# Patient Record
Sex: FEMALE | Race: WHITE | Employment: OTHER | URBAN - METROPOLITAN AREA
[De-identification: names, ages, dates, MRNs, and addresses within clinical notes are randomized per-mention and may not be internally consistent; named-entity substitution may affect disease eponyms.]

---

## 2018-01-25 ENCOUNTER — HOSPITAL ENCOUNTER (OUTPATIENT)
Dept: MRI IMAGING | Age: 83
Discharge: HOME | End: 2018-01-25
Attending: ORTHOPAEDIC SURGERY
Payer: MEDICARE

## 2018-01-25 DIAGNOSIS — M51.36 DDD (DEGENERATIVE DISC DISEASE), LUMBAR: ICD-10-CM

## 2018-01-25 PROCEDURE — 72148 MRI LUMBAR SPINE W/O DYE: CPT

## 2018-02-07 ENCOUNTER — HOSPITAL ENCOUNTER (OUTPATIENT)
Dept: INTERVENTIONAL RADIOLOGY/VASCULAR | Age: 83
Discharge: HOME | End: 2018-02-07
Attending: ORTHOPAEDIC SURGERY | Admitting: ORTHOPAEDIC SURGERY
Payer: MEDICARE

## 2018-02-07 ENCOUNTER — HOSPITAL ENCOUNTER (OUTPATIENT)
Dept: CT IMAGING | Age: 83
Discharge: HOME | End: 2018-02-07
Attending: ORTHOPAEDIC SURGERY
Payer: MEDICARE

## 2018-02-07 ENCOUNTER — HOSPITAL ENCOUNTER (OUTPATIENT)
Dept: INTERVENTIONAL RADIOLOGY/VASCULAR | Age: 83
Discharge: HOME | End: 2018-02-07
Attending: ORTHOPAEDIC SURGERY
Payer: MEDICARE

## 2018-02-07 VITALS
OXYGEN SATURATION: 96 % | SYSTOLIC BLOOD PRESSURE: 152 MMHG | DIASTOLIC BLOOD PRESSURE: 87 MMHG | BODY MASS INDEX: 21.22 KG/M2 | HEIGHT: 68 IN | HEART RATE: 74 BPM | RESPIRATION RATE: 20 BRPM | TEMPERATURE: 97.5 F | WEIGHT: 140 LBS

## 2018-02-07 DIAGNOSIS — M79.18 BILATERAL BUTTOCK PAIN: ICD-10-CM

## 2018-02-07 PROCEDURE — 88311 DECALCIFY TISSUE: CPT | Performed by: RADIOLOGY

## 2018-02-07 PROCEDURE — 77030034842 HC TAMP SPN BN INFL EXP II KYPH -I

## 2018-02-07 PROCEDURE — 77030030399

## 2018-02-07 PROCEDURE — 77030003666 HC NDL SPINAL BD -A

## 2018-02-07 PROCEDURE — 99152 MOD SED SAME PHYS/QHP 5/>YRS: CPT

## 2018-02-07 PROCEDURE — 88307 TISSUE EXAM BY PATHOLOGIST: CPT | Performed by: RADIOLOGY

## 2018-02-07 PROCEDURE — 77030034843 HC TAMP SPN BN INFL EXP II KYPH -H

## 2018-02-07 PROCEDURE — 77030003503 HC NDL BIOP TISS BD -B

## 2018-02-07 PROCEDURE — 77030021783 HC SYS CEM DEL MEDT -D

## 2018-02-07 PROCEDURE — 74011250636 HC RX REV CODE- 250/636: Performed by: RADIOLOGY

## 2018-02-07 PROCEDURE — 0201T PERQ SACRAL AUGMT BILAT INJ: CPT

## 2018-02-07 PROCEDURE — 77030021782 HC SYS CEM CART DEL KYPH -C

## 2018-02-07 PROCEDURE — 74011000250 HC RX REV CODE- 250: Performed by: RADIOLOGY

## 2018-02-07 PROCEDURE — 77030007884 HC KT SPN FX KYPH -I2

## 2018-02-07 PROCEDURE — C1713 ANCHOR/SCREW BN/BN,TIS/BN: HCPCS

## 2018-02-07 RX ORDER — DIPHENHYDRAMINE HYDROCHLORIDE 50 MG/ML
25-50 INJECTION, SOLUTION INTRAMUSCULAR; INTRAVENOUS ONCE
Status: COMPLETED | OUTPATIENT
Start: 2018-02-07 | End: 2018-02-07

## 2018-02-07 RX ORDER — LIDOCAINE HYDROCHLORIDE 20 MG/ML
30 INJECTION, SOLUTION INFILTRATION; PERINEURAL ONCE
Status: COMPLETED | OUTPATIENT
Start: 2018-02-07 | End: 2018-02-07

## 2018-02-07 RX ORDER — LATANOPROST 50 UG/ML
1 SOLUTION/ DROPS OPHTHALMIC DAILY
COMMUNITY

## 2018-02-07 RX ORDER — CHLORTHALIDONE 25 MG/1
12.5 TABLET ORAL DAILY
COMMUNITY

## 2018-02-07 RX ORDER — MIDAZOLAM HYDROCHLORIDE 1 MG/ML
5 INJECTION, SOLUTION INTRAMUSCULAR; INTRAVENOUS
Status: DISCONTINUED | OUTPATIENT
Start: 2018-02-07 | End: 2018-02-07

## 2018-02-07 RX ORDER — CEFAZOLIN SODIUM/WATER 2 G/20 ML
2 SYRINGE (ML) INTRAVENOUS ONCE
Status: COMPLETED | OUTPATIENT
Start: 2018-02-07 | End: 2018-02-07

## 2018-02-07 RX ORDER — ASPIRIN 81 MG/1
81 TABLET ORAL DAILY
COMMUNITY

## 2018-02-07 RX ORDER — KETOROLAC TROMETHAMINE 30 MG/ML
15 INJECTION, SOLUTION INTRAMUSCULAR; INTRAVENOUS ONCE
Status: COMPLETED | OUTPATIENT
Start: 2018-02-07 | End: 2018-02-07

## 2018-02-07 RX ORDER — ROSUVASTATIN CALCIUM 20 MG/1
20 TABLET, COATED ORAL DAILY
COMMUNITY

## 2018-02-07 RX ORDER — VALSARTAN 160 MG/1
160 TABLET ORAL 2 TIMES DAILY
COMMUNITY

## 2018-02-07 RX ORDER — BRIMONIDINE TARTRATE, TIMOLOL MALEATE 2; 5 MG/ML; MG/ML
1 SOLUTION/ DROPS OPHTHALMIC EVERY 12 HOURS
COMMUNITY

## 2018-02-07 RX ORDER — FENTANYL CITRATE 50 UG/ML
100 INJECTION, SOLUTION INTRAMUSCULAR; INTRAVENOUS
Status: DISCONTINUED | OUTPATIENT
Start: 2018-02-07 | End: 2018-02-07

## 2018-02-07 RX ORDER — METOPROLOL TARTRATE 50 MG/1
50 TABLET ORAL 2 TIMES DAILY
COMMUNITY

## 2018-02-07 RX ORDER — LEVOTHYROXINE SODIUM 88 UG/1
88 TABLET ORAL
COMMUNITY

## 2018-02-07 RX ORDER — BUPIVACAINE HYDROCHLORIDE 5 MG/ML
30 INJECTION, SOLUTION EPIDURAL; INTRACAUDAL ONCE
Status: COMPLETED | OUTPATIENT
Start: 2018-02-07 | End: 2018-02-07

## 2018-02-07 RX ORDER — ACETAMINOPHEN 500 MG
2000 TABLET ORAL 2 TIMES DAILY
COMMUNITY

## 2018-02-07 RX ORDER — SODIUM CHLORIDE 9 MG/ML
25 INJECTION, SOLUTION INTRAVENOUS CONTINUOUS
Status: DISCONTINUED | OUTPATIENT
Start: 2018-02-07 | End: 2018-02-07

## 2018-02-07 RX ADMIN — Medication 2 G: at 11:16

## 2018-02-07 RX ADMIN — FENTANYL CITRATE 25 MCG: 50 INJECTION, SOLUTION INTRAMUSCULAR; INTRAVENOUS at 13:32

## 2018-02-07 RX ADMIN — MIDAZOLAM HYDROCHLORIDE 1 MG: 1 INJECTION, SOLUTION INTRAMUSCULAR; INTRAVENOUS at 11:54

## 2018-02-07 RX ADMIN — KETOROLAC TROMETHAMINE 15 MG: 30 INJECTION, SOLUTION INTRAMUSCULAR at 10:35

## 2018-02-07 RX ADMIN — MIDAZOLAM HYDROCHLORIDE 1 MG: 1 INJECTION, SOLUTION INTRAMUSCULAR; INTRAVENOUS at 12:50

## 2018-02-07 RX ADMIN — FENTANYL CITRATE 50 MCG: 50 INJECTION, SOLUTION INTRAMUSCULAR; INTRAVENOUS at 11:42

## 2018-02-07 RX ADMIN — MIDAZOLAM HYDROCHLORIDE 1 MG: 1 INJECTION, SOLUTION INTRAMUSCULAR; INTRAVENOUS at 12:58

## 2018-02-07 RX ADMIN — MIDAZOLAM HYDROCHLORIDE 1 MG: 1 INJECTION, SOLUTION INTRAMUSCULAR; INTRAVENOUS at 13:32

## 2018-02-07 RX ADMIN — MIDAZOLAM HYDROCHLORIDE 2 MG: 1 INJECTION, SOLUTION INTRAMUSCULAR; INTRAVENOUS at 11:41

## 2018-02-07 RX ADMIN — FENTANYL CITRATE 25 MCG: 50 INJECTION, SOLUTION INTRAMUSCULAR; INTRAVENOUS at 11:54

## 2018-02-07 RX ADMIN — MIDAZOLAM HYDROCHLORIDE 1 MG: 1 INJECTION, SOLUTION INTRAMUSCULAR; INTRAVENOUS at 13:20

## 2018-02-07 RX ADMIN — BUPIVACAINE HYDROCHLORIDE 15 ML: 5 INJECTION, SOLUTION EPIDURAL; INTRACAUDAL; PERINEURAL at 13:20

## 2018-02-07 RX ADMIN — FENTANYL CITRATE 25 MCG: 50 INJECTION, SOLUTION INTRAMUSCULAR; INTRAVENOUS at 11:48

## 2018-02-07 RX ADMIN — SODIUM CHLORIDE 25 ML/HR: 900 INJECTION, SOLUTION INTRAVENOUS at 09:33

## 2018-02-07 RX ADMIN — LIDOCAINE HYDROCHLORIDE 200 MG: 20 INJECTION, SOLUTION INFILTRATION; PERINEURAL at 13:20

## 2018-02-07 RX ADMIN — DIPHENHYDRAMINE HYDROCHLORIDE 25 MG: 50 INJECTION, SOLUTION INTRAMUSCULAR; INTRAVENOUS at 11:00

## 2018-02-07 RX ADMIN — MIDAZOLAM HYDROCHLORIDE 1 MG: 1 INJECTION, SOLUTION INTRAMUSCULAR; INTRAVENOUS at 11:48

## 2018-02-07 RX ADMIN — FENTANYL CITRATE 25 MCG: 50 INJECTION, SOLUTION INTRAMUSCULAR; INTRAVENOUS at 12:50

## 2018-02-07 NOTE — ROUTINE PROCESS
VS stable, pt has no complaint of pain at this time. Band aids to upper back and sacral area are clean, dry and intact. Pt ambulated with assistance without weakness or pain. Discharge instructions given and reviewed with pt and pt's daughter Claudia Ambriz. Both state complete understanding of all instructions given. Pt taken out via wheelchair and discharged to home with daughter in car.

## 2018-02-07 NOTE — ROUTINE PROCESS
Dr. Duke Vasquez into speak with pt and procedure explained along with risks and benefits; consent obtained at this time for Kyphoplasty T-11 and Sacroplasty procedure.

## 2018-02-07 NOTE — DISCHARGE INSTRUCTIONS
[address]  Special Procedures/Radiology Department                  Radiologist: Dr. Rachel Galindo                 Date:  02/07/2018    Kyphoplasty Discharge Instructions    Restrict your activity the next 24 hours. Resume your previous diet and follow medication reconciliation form. You may take Tylenol, as directed on the label, for pain or discomfort. Avoid ibuprofen (Advil, Motrin) and aspirin as they may cause bleeding, or continue your prescription pain medications as directed. Avoid lifting anything heavier than 5 pounds. Avoid excessive bending and lifting for one week    Be sure to follow up with your physician, and let him know how you are progressing. If you have severe pain, numbness, tingling, or weakness in your legs go to the nearest emergency department, and tell them you have had a kyphoplasty.

## 2018-02-07 NOTE — IP AVS SNAPSHOT
[address] [place] [address] 
[phone] Patient: Elizabeth Small MRN: [id number]:[id number] About your hospitalization You were admitted on:  February 7, 2018 You last received care in the:  [facility] RAD ANGIO IR You were discharged on:  February 7, 2018 Why you were hospitalized Your primary diagnosis was:  Not on File Follow-up Information None Discharge Orders None A check amaya indicates which time of day the medication should be taken. My Medications ASK your doctor about these medications Instructions Each Dose to Equal  
 Morning Noon Evening Bedtime AMLODIPINE PO Your last dose was: Your next dose is: Take 2.5 mg by mouth daily. 2.5 mg  
    
   
   
   
  
 aspirin delayed-release 81 mg tablet Your last dose was: Your next dose is: Take 81 mg by mouth daily. 81 mg  
    
   
   
   
  
 chlorthalidone 25 mg tablet Commonly known as:  Othelia Don Your last dose was: Your next dose is: Take 12.5 mg by mouth daily. 12.5 mg Cholecalciferol (Vitamin D3) 2,000 unit Cap capsule Commonly known as:  VITAMIN D3 Your last dose was: Your next dose is: Take 2,000 Units by mouth two (2) times a day. 2000 Units COMBIGAN 0.2-0.5 % Drop ophthalmic solution Generic drug:  brimonidine-timolol Your last dose was: Your next dose is:    
   
   
 Administer 1 Drop to both eyes every twelve (12) hours. 1 Drop * HumuLIN N 100 unit/mL injection Generic drug:  insulin NPH Your last dose was: Your next dose is:    
   
   
 10 Units by SubCUTAneous route Daily (before dinner). 10 Units * HumuLIN N 100 unit/mL injection Generic drug:  insulin NPH  
   
 Your last dose was: Your next dose is:    
   
   
 25 Units by SubCUTAneous route Daily (before breakfast). 25 Units  
    
   
   
   
  
 latanoprost 0.005 % ophthalmic solution Commonly known as:  Elverna Casandra Your last dose was: Your next dose is:    
   
   
 Administer 1 Drop to both eyes daily. 1 Drop  
    
   
   
   
  
 levothyroxine 88 mcg tablet Commonly known as:  SYNTHROID Your last dose was: Your next dose is: Take 88 mcg by mouth Daily (before breakfast). 88 mcg  
    
   
   
   
  
 metoprolol tartrate 50 mg tablet Commonly known as:  LOPRESSOR Your last dose was: Your next dose is: Take 50 mg by mouth two (2) times a day. 50 mg  
    
   
   
   
  
 OCUVITE LUTEIN PO Your last dose was: Your next dose is: Take 1 Tab by mouth daily. 1 Tab  
    
   
   
   
  
 rosuvastatin 20 mg tablet Commonly known as:  CRESTOR Your last dose was: Your next dose is: Take 20 mg by mouth daily. 20 mg  
    
   
   
   
  
 valsartan 160 mg tablet Commonly known as:  DIOVAN Your last dose was: Your next dose is: Take 160 mg by mouth two (2) times a day. 160 mg  
    
   
   
   
  
 * Notice: This list has 2 medication(s) that are the same as other medications prescribed for you. Read the directions carefully, and ask your doctor or other care provider to review them with you. Discharge Instructions [facility] Special Procedures/Radiology Department Radiologist: Dr. Pacheco Begin Date:  02/07/2018 Kyphoplasty Discharge Instructions Restrict your activity the next 24 hours. Resume your previous diet and follow medication reconciliation form. You may take Tylenol, as directed on the label, for pain or discomfort. Avoid ibuprofen (Advil, Motrin) and aspirin as they may cause bleeding, or continue your prescription pain medications as directed. Avoid lifting anything heavier than 5 pounds. Avoid excessive bending and lifting for one week Be sure to follow up with your physician, and let him know how you are progressing. If you have severe pain, numbness, tingling, or weakness in your legs go to the nearest emergency department, and tell them you have had a kyphoplasty. Introducing [facility]! [facility] [place]: 
Thank you for requesting a Pogose[employer] account. Our records indicate that you have previously registered for a Pogoseat account but its currently inactive. Please call our Pogoseat support line at [phone]. Additional Information If you have questions, please visit the Frequently Asked Questions section of the Pogose[employer] website at [email or web address] Remember, P[employer]at is NOT to be used for urgent needs. For medical emergencies, dial 911. Now available from your iPhone and Android! Providers Seen During Your Hospitalization Provider Specialty Primary office phone [facility], [facility] / [facility] [phone] Your Primary Care Physician (PCP) Primary Care Physician Office Phone Office Fax Efrain Starks [phone] You are allergic to the following Allergen Reactions Sulfa (Sulfonamide Antibiotics) Other (comments) Bloody urine; problems with urination Recent Documentation Height Weight Breastfeeding? BMI  
  
 1.727 m 63.5 kg No 21.29 kg/m2 Emergency Contacts Name Discharge Info Relation Home Work Mobile Mckayla Macedo DISCHARGE CAREGIVER [3] Child [2] [phone] Patient Belongings The following personal items are in your possession at time of discharge: 
     Visual Aid: Glasses, Other (comment) (With daughter) Please provide this summary of care documentation to your next provider. Signatures-by signing, you are acknowledging that this After Visit Summary has been reviewed with you and you have received a copy. Patient Signature:  ____________________________________________________________ Date:  ____________________________________________________________  
  
Cherylle Cumins Provider Signature:  ____________________________________________________________ Date:  ____________________________________________________________

## 2018-02-07 NOTE — IP AVS SNAPSHOT
[address] [facility] 
[phone] Patient: Destinee Thurston MRN: [id number] PANKAJ:[birth year] A check amaya indicates which time of day the medication should be taken. My Medications ASK your doctor about these medications Instructions Each Dose to Equal  
 Morning Noon Evening Bedtime AMLODIPINE PO Your last dose was: Your next dose is: Take 2.5 mg by mouth daily. 2.5 mg  
    
   
   
   
  
 aspirin delayed-release 81 mg tablet Your last dose was: Your next dose is: Take 81 mg by mouth daily. 81 mg  
    
   
   
   
  
 chlorthalidone 25 mg tablet Commonly known as:  Rexvirgie Anderson Your last dose was: Your next dose is: Take 12.5 mg by mouth daily. 12.5 mg Cholecalciferol (Vitamin D3) 2,000 unit Cap capsule Commonly known as:  VITAMIN D3 Your last dose was: Your next dose is: Take 2,000 Units by mouth two (2) times a day. 2000 Units COMBIGAN 0.2-0.5 % Drop ophthalmic solution Generic drug:  brimonidine-timolol Your last dose was: Your next dose is:    
   
   
 Administer 1 Drop to both eyes every twelve (12) hours. 1 Drop * HumuLIN N 100 unit/mL injection Generic drug:  insulin NPH Your last dose was: Your next dose is:    
   
   
 10 Units by SubCUTAneous route Daily (before dinner). 10 Units * HumuLIN N 100 unit/mL injection Generic drug:  insulin NPH Your last dose was: Your next dose is:    
   
   
 25 Units by SubCUTAneous route Daily (before breakfast). 25 Units  
    
   
   
   
  
 latanoprost 0.005 % ophthalmic solution Commonly known as:  Tati Antonio Your last dose was: Your next dose is: Administer 1 Drop to both eyes daily. 1 Drop  
    
   
   
   
  
 levothyroxine 88 mcg tablet Commonly known as:  SYNTHROID Your last dose was: Your next dose is: Take 88 mcg by mouth Daily (before breakfast). 88 mcg  
    
   
   
   
  
 metoprolol tartrate 50 mg tablet Commonly known as:  LOPRESSOR Your last dose was: Your next dose is: Take 50 mg by mouth two (2) times a day. 50 mg  
    
   
   
   
  
 OCUVITE LUTEIN PO Your last dose was: Your next dose is: Take 1 Tab by mouth daily. 1 Tab  
    
   
   
   
  
 rosuvastatin 20 mg tablet Commonly known as:  CRESTOR Your last dose was: Your next dose is: Take 20 mg by mouth daily. 20 mg  
    
   
   
   
  
 valsartan 160 mg tablet Commonly known as:  DIOVAN Your last dose was: Your next dose is: Take 160 mg by mouth two (2) times a day. 160 mg  
    
   
   
   
  
 * Notice: This list has 2 medication(s) that are the same as other medications prescribed for you. Read the directions carefully, and ask your doctor or other care provider to review them with you.

## 2018-02-07 NOTE — H&P
Radiology History and Physical    Patient: Paz Delatorre 80 y.o. female       Chief Complaint: No chief complaint on file. History of Present Illness: T11 fracture and bilateral sacral fractures     History:    No past medical history on file. No family history on file. Social History     Social History    Marital status:      Spouse name: N/A    Number of children: N/A    Years of education: N/A     Occupational History    Not on file. Social History Main Topics    Smoking status: Not on file    Smokeless tobacco: Not on file    Alcohol use Not on file    Drug use: Not on file    Sexual activity: Not on file     Other Topics Concern    Not on file     Social History Narrative       Allergies: Allergies   Allergen Reactions    Sulfa (Sulfonamide Antibiotics) Other (comments)     Bloody urine; problems with urination       Current Medications:  Current Outpatient Prescriptions   Medication Sig    aspirin delayed-release 81 mg tablet Take 81 mg by mouth daily.  chlorthalidone (HYGROTEN) 25 mg tablet Take 12.5 mg by mouth daily.  Cholecalciferol, Vitamin D3, (VITAMIN D3) 2,000 unit cap capsule Take 2,000 Units by mouth two (2) times a day.  brimonidine-timolol (COMBIGAN) 0.2-0.5 % drop ophthalmic solution Administer 1 Drop to both eyes every twelve (12) hours.  insulin NPH (HUMULIN N) 100 unit/mL injection 10 Units by SubCUTAneous route Daily (before dinner).  insulin NPH (HUMULIN N) 100 unit/mL injection 25 Units by SubCUTAneous route Daily (before breakfast).  latanoprost (XALATAN) 0.005 % ophthalmic solution Administer 1 Drop to both eyes daily.  levothyroxine (SYNTHROID) 88 mcg tablet Take 88 mcg by mouth Daily (before breakfast).  metoprolol tartrate (LOPRESSOR) 50 mg tablet Take 50 mg by mouth two (2) times a day.  AMLODIPINE BESYLATE (AMLODIPINE PO) Take 2.5 mg by mouth daily.  rosuvastatin (CRESTOR) 20 mg tablet Take 20 mg by mouth daily.     valsartan (DIOVAN) 160 mg tablet Take 160 mg by mouth two (2) times a day.  VIT C/VIT E ACET/LUTEIN/MIN (OCUVITE LUTEIN PO) Take 1 Tab by mouth daily. Current Facility-Administered Medications   Medication Dose Route Frequency    iopamidol (ISOVUE-M 300) 61 % contrast solution 15 mL  15 mL IntraSPINal ONCE        Physical Exam:  Blood pressure 147/65, pulse 72, temperature 97.5 °F (36.4 °C), resp. rate 20, height 5' 8\" (1.727 m), weight 63.5 kg (140 lb), SpO2 100 %, not currently breastfeeding. LUNG: clear to auscultation bilaterally, HEART: regular rate and rhythm, S1, S2 normal, no murmur, click, rub or gallop      Alerts:      Laboratory:    No results for input(s): HGB, HCT, WBC, PLT, INR, BUN, CREA, K, CRCLT, HGBEXT, HCTEXT, PLTEXT in the last 72 hours. No lab exists for component: PTT, PT, INREXT      Plan of Care/Planned Procedure:  Risks, benefits, and alternatives reviewed with patient and she agrees to proceed with the procedure.      Plan is for T11 kyphoplasty and bilateral sacroplasty       Stacia Timmons MD

## 2018-02-10 ENCOUNTER — HOSPITAL ENCOUNTER (EMERGENCY)
Age: 83
Discharge: HOME | End: 2018-02-10
Attending: EMERGENCY MEDICINE
Payer: MEDICARE

## 2018-02-10 ENCOUNTER — APPOINTMENT (OUTPATIENT)
Dept: MRI IMAGING | Age: 83
End: 2018-02-10
Attending: EMERGENCY MEDICINE
Payer: MEDICARE

## 2018-02-10 VITALS
OXYGEN SATURATION: 97 % | DIASTOLIC BLOOD PRESSURE: 54 MMHG | HEART RATE: 73 BPM | RESPIRATION RATE: 16 BRPM | SYSTOLIC BLOOD PRESSURE: 120 MMHG | TEMPERATURE: 98 F

## 2018-02-10 DIAGNOSIS — R20.0 LEG NUMBNESS: Primary | ICD-10-CM

## 2018-02-10 PROCEDURE — 99283 EMERGENCY DEPT VISIT LOW MDM: CPT

## 2018-02-10 PROCEDURE — 72148 MRI LUMBAR SPINE W/O DYE: CPT

## 2018-02-10 NOTE — ED NOTES
Dr. Rupa Doshi reviewed discharge instructions with the patient and caregiver. The patient and caregiver verbalized understanding.

## 2018-02-10 NOTE — ED TRIAGE NOTES
Pt reports having Kyphoplasty on 2/7/1018 for 2 sacral fractures and T 11 fracture. Pt began last night @ 2000 with bilateral leg numbness. Pt denies having weakness to either leg and states she is able to ambulate. Pt also C/O SOB this morning. Dr Mallika Jose did procedure.

## 2018-02-10 NOTE — ED NOTES
Patient c/o bilateral tingling/numbness below the knees, started last night. Able to feel sensation equally bilaterally. States no issue with ambulation.

## 2018-02-10 NOTE — DISCHARGE INSTRUCTIONS
We hope that we have addressed all of your medical concerns. The examination and treatment you received in the Emergency Department were for an emergent problem and were not intended as complete care. It is important that you follow up with your healthcare provider(s) for ongoing care. If your symptoms worsen or do not improve as expected, and you are unable to reach your usual health care provider(s), you should return to the Emergency Department. Today's healthcare is undergoing tremendous change, and patient satisfaction surveys are one of the many tools to assess the quality of medical care. You may receive a survey from the [employer] organization regarding your experience in the Emergency Department. I hope that your experience has been completely positive, particularly the medical care that I provided. As such, please participate in the survey; anything less than excellent does not meet my expectations or intentions. [facility] and 8 [facility] participate in nationally recognized quality of care measures. If your blood pressure is greater than 120/80, as reported below, we urge that you seek medical care to address the potential of high blood pressure, commonly known as hypertension. Hypertension can be hereditary or can be caused by certain medical conditions, pain, stress, or \"white coat syndrome. \"       Please make an appointment with your health care provider(s) for follow up of your Emergency Department visit. VITALS:   Patient Vitals for the past 8 hrs:   Temp Pulse Resp BP SpO2   02/10/18 1100 98 °F (36.7 °C) 73 16 120/54 97 %   02/10/18 1058 98 °F (36.7 °C) - - - -   02/10/18 0946 - - - - 95 %   02/10/18 0945 - - - 109/50 -   02/10/18 0915 - - - 128/53 97 %   02/10/18 0839 97.7 °F (36.5 °C) 82 18 149/71 96 %          Thank you for allowing us to provide you with medical care today.   We realize that you have many choices for your emergency care needs. Please choose us in the future for any continued health care needs. Garwin Hang Annah Blizzard, [address]: [phone]            No results found for this or any previous visit (from the past 24 hour(s)). Mri Lumb Spine Wo Cont    Result Date: 2/10/2018  EXAM:  MRI LUMB SPINE WO CONT INDICATION:  include sacrum and t 11. COMPARISON: None TECHNIQUE: MR imaging of the lumbar spine was performed using the following sequences: sagittal T1, T2, STIR;  axial T1, T2. CONTRAST:  None. FINDINGS: There is a moderate to severe S-shaped curvature in the thoracolumbar spine. There is minimal 2 mm anterolisthesis of L2 on L3. Prior kyphoplasty at T11. Bilateral sacral plasty. Nondisplaced transverse process fracture at L5. The conus medullaris terminates at L2. Signal and caliber of the distal spinal cord are within normal limits. Right renal cyst. Lower thoracic spine: Small disc osteophyte complex at T12-L1. Mild left foraminal stenosis. No canal compromise or cord compression. No atypical postprocedural findings at T11. Hemangioma at T12. L1-L2:  Disc bulge/osteophyte. Canal is patent. No significant foraminal stenosis. L2-L3:  Disc bulge/osteophyte. Facet arthropathy. Canal and foramina are patent L3-L4:  Disc bulge/osteophyte. Mild bilateral facet arthropathy. No significant canal or foraminal stenosis. No significant extension of edema into the pedicle on the left. There is mild extension of edema into the pedicle on the right. Superior endplate deformity less than 20% loss vertebral body height. L4-L5: Mild facet arthropathy. Disc bulge. Moderate right foraminal stenosis. L5-S1:  Disc bulge/osteophyte. Moderate facet arthropathy. Severe bilateral foraminal stenoses. Tiny synovial cyst projects into the left neuroforamen. Mild central spinal canal stenosis.      IMPRESSION: Acute vertebral compression deformity along the superior endplate of L3. Less than 30% loss vertebral body height. No cortical retropulsion. No epidural hematoma or significant extension to the posterior elements. This fracture is amenable to percutaneous kyphoplasty. Status post sacral plasty on the right and on the left. Status post kyphoplasty at T11. No unusual postprocedural findings. Otherwise multilevel disc and facet degenerative change with scoliosis. Severe bilateral foraminal stenoses at L5-S1. Moderate right foraminal stenosis at L4-5.

## 2018-02-10 NOTE — ED PROVIDER NOTES
HPI Comments: 80 y.o. female with no significant past medical histor who presents from Home with chief complaint of Numbness. Patient recently completed a Kyphoplasty on 2/7/1018 for two sacral fractures and T 11 fracture by Dr Lon Ibarra. Patient reports onset last night at 2000 of bilateral lower leg numbness. Pt reports constant symptoms since onset. Pt reports previous history of episodic numbness, however patient reports symptoms presented today are new. Pt denies having weakness to either leg and states she is able to ambulate Pt denies fever, chills, cough, congestion, shortness of breath, chest pain, abdominal pain, nausea, vomiting, diarrhea, difficulty with urination or dysuria. There are no other acute medical concerns at this time. PCP: Ruben Calvert MD    Note written by Stephanie Cortes, as dictated by Cecile Baca MD 8:59 AM    The history is provided by the patient. History reviewed. No pertinent past medical history. History reviewed. No pertinent surgical history. History reviewed. No pertinent family history. Social History     Social History    Marital status:      Spouse name: N/A    Number of children: N/A    Years of education: N/A     Occupational History    Not on file. Social History Main Topics    Smoking status: Never Smoker    Smokeless tobacco: Never Used    Alcohol use No    Drug use: Not on file    Sexual activity: Not on file     Other Topics Concern    Not on file     Social History Narrative    No narrative on file         ALLERGIES: Sulfa (sulfonamide antibiotics)    Review of Systems   Constitutional: Negative for chills and fever. HENT: Negative for congestion. Respiratory: Negative for cough and shortness of breath. Cardiovascular: Negative for chest pain. Gastrointestinal: Negative for abdominal pain, diarrhea, nausea and vomiting. Genitourinary: Negative for difficulty urinating and dysuria. Neurological: Positive for numbness. All other systems reviewed and are negative. Vitals:    02/10/18 0945 02/10/18 0946 02/10/18 1058 02/10/18 1100   BP: 109/50   120/54   Pulse:    73   Resp:    16   Temp:   98 °F (36.7 °C) 98 °F (36.7 °C)   SpO2:  95%  97%            Physical Exam   Constitutional: She is oriented to person, place, and time. She appears well-developed and well-nourished. HENT:   Head: Normocephalic and atraumatic. Mouth/Throat: Oropharynx is clear and moist.   Eyes: EOM are normal. Pupils are equal, round, and reactive to light. Neck: Normal range of motion. Neck supple. Cardiovascular: Normal rate, regular rhythm, normal heart sounds and intact distal pulses. Exam reveals no gallop and no friction rub. No murmur heard. Pulmonary/Chest: Effort normal. No respiratory distress. She has no wheezes. She has no rales. Abdominal: Soft. There is no tenderness. There is no rebound. Musculoskeletal: Normal range of motion. She exhibits no tenderness. Neurological: She is alert and oriented to person, place, and time. No cranial nerve deficit. Subjective decrease sensation in bilateral lower extremities below the knees   Skin: No erythema. Psychiatric: She has a normal mood and affect. Her behavior is normal.   Nursing note and vitals reviewed. Note written by Dianan Quiroga, as dictated by Ernesto Caldwell MD 8:59 AM    [facility]      ED Course       Procedures  CONSULT NOTE:  11:14 AM Ernesto Caldwell MD spoke with Dr. Ana Paula Martinez , Consult for Orthopedics. Discussed available diagnostic tests and clinical findings. He is in agreement with care plans as outlined. Agrees there is no complication at this time.  Dr. Daphne Cobb and Dr. Calixto Brantley are aware and will follow patient closely